# Patient Record
Sex: MALE | Race: WHITE | NOT HISPANIC OR LATINO | URBAN - METROPOLITAN AREA
[De-identification: names, ages, dates, MRNs, and addresses within clinical notes are randomized per-mention and may not be internally consistent; named-entity substitution may affect disease eponyms.]

---

## 2020-01-31 ENCOUNTER — COMMUNICATION - HEALTHEAST (OUTPATIENT)
Dept: SCHEDULING | Facility: CLINIC | Age: 40
End: 2020-01-31

## 2020-02-05 ENCOUNTER — RECORDS - HEALTHEAST (OUTPATIENT)
Dept: LAB | Facility: CLINIC | Age: 40
End: 2020-02-05

## 2020-02-08 LAB
BACTERIA SPEC CULT: NO GROWTH
BACTERIA SPEC CULT: NORMAL
GRAM STAIN RESULT: NORMAL
GRAM STAIN RESULT: NORMAL

## 2021-06-05 NOTE — TELEPHONE ENCOUNTER
RN Assessment/Reason for Call:   Okay to leave Detailed Message  Jaswinder calling in, referred to go to ER by Orthopedic Dr.  Ultrasound on finger, drain or gabriel it for bone infection.  [facility] 1/30/2020 oral antibiotics.  Radiologist saw infection eating away at his bone  Went to [place] [facility] recommended [facility].  He cant drive.     RN Action/Disposition:  Will go to [facility] ER  Agrees to plan.     Amy Bloom RN    [facility] Triage/med refill  1/31/2020  7:42 PM